# Patient Record
Sex: MALE | Race: WHITE
[De-identification: names, ages, dates, MRNs, and addresses within clinical notes are randomized per-mention and may not be internally consistent; named-entity substitution may affect disease eponyms.]

---

## 2019-09-23 ENCOUNTER — HOSPITAL ENCOUNTER (EMERGENCY)
Dept: HOSPITAL 72 - EMR | Age: 59
LOS: 1 days | Discharge: HOME | End: 2019-09-24
Payer: MEDICARE

## 2019-09-23 VITALS — WEIGHT: 160 LBS | BODY MASS INDEX: 25.71 KG/M2 | HEIGHT: 66 IN

## 2019-09-23 VITALS — SYSTOLIC BLOOD PRESSURE: 136 MMHG | DIASTOLIC BLOOD PRESSURE: 89 MMHG

## 2019-09-23 DIAGNOSIS — M54.5: Primary | ICD-10-CM

## 2019-09-23 DIAGNOSIS — G89.29: ICD-10-CM

## 2019-09-23 DIAGNOSIS — F10.10: ICD-10-CM

## 2019-09-23 PROCEDURE — 96372 THER/PROPH/DIAG INJ SC/IM: CPT

## 2019-09-23 PROCEDURE — 93005 ELECTROCARDIOGRAM TRACING: CPT

## 2019-09-23 PROCEDURE — 94640 AIRWAY INHALATION TREATMENT: CPT

## 2019-09-23 PROCEDURE — 99284 EMERGENCY DEPT VISIT MOD MDM: CPT

## 2019-09-23 NOTE — NUR
ED Nurse Note:

Pt brought in by mely 58 c/o RT leg pain since AM . Denies fall, no trauma. 
Pt has been drinking and appears drunk. VSS

## 2019-09-23 NOTE — EMERGENCY ROOM REPORT
History of Present Illness


General


Chief Complaint:  Lower Back Pain or Injury


Source:  Patient





Present Illness


HPI


Patient 59-year-old male who presents after increased generalized pain to the 

epigastric area as well as to the low back.  He reports having generalized 

pain.  He states that he had been drinking regularly for several years.  

Reports having persistent pain to his right lower extremity from playing soccer 

for many years.Patient reports drinking alcohol  daily he denies a fever.  He 

denies dysuria or abdominal pain.  He reports urinating normally.  He reports 

having some chronic pain to the right lower extremity.


Allergies:  


Coded Allergies:  


     No Known Allergies (Unverified , 9/23/19)





Patient History


Past Medical History:  see triage record


Reviewed Nursing Documentation:  PMH: Agreed; PSxH: Agreed





Nursing Documentation-PMH


Past Medical History Deferred:  Patient Unconscious





Review of Systems


All Other Systems:  negative except mentioned in HPI





Physical Exam





Vital Signs








  Date Time  Temp Pulse Resp B/P (MAP) Pulse Ox O2 Delivery O2 Flow Rate FiO2


 


9/23/19 20:44 97.9 100 18 136/89 (105) 98 Room Air  








Sp02 EP Interpretation:  reviewed, normal


General Appearance:  normal inspection, alert, non-toxic, Chronically Ill


Head:  atraumatic


ENT:  normal ENT inspection, hearing grossly normal, normal voice


Neck:  normal inspection, full range of motion, supple, no bony tend


Respiratory:  normal inspection, lungs clear, normal breath sounds, no 

respiratory distress, no retraction, no wheezing


Cardiovascular #1:  regular rate, rhythm, no edema


Gastrointestinal:  normal inspection, normal bowel sounds, non tender, soft, no 

guarding, no hernia


Genitourinary:  no CVA tenderness


Musculoskeletal:  normal inspection, back normal, normal range of motion


Neurologic:  normal inspection, alert, responsive, speech normal, other - 

Slurred speech


Psychiatric:  normal inspection, judgement/insight normal, mood/affect normal





Medical Decision Making


Diagnostic Impression:  


 Primary Impression:  


 Alcohol abuse


 Additional Impression:  


 Exacerbation of chronic back pain


ER Course


Patient presented for low back pain.  Differential included but was not limited 

to herniated disc, cauda equina syndrome, abdominal aortic aneurysm, perforated 

ulcer, spinal epidural abscess, spinal stenosis, lumbar fracture, metastatic 

lesion, pyelonephritis.   


Patient was noted to initially be intoxicated with alcohol and pain appears to 

be somewhat chronic.





Patient was observed in the emergency department.  He was noted to be more 

sober and was subsequent given nonnarcotic pain medications.





Patient was advised alcohol cessation.  Does not show any signs of incontinence 

or urinary retention. 


At the time of discharge patient is awake alert oriented and able to ambulate 

without assistance.


Patient was given prescription for symptomatic treatment.  


Patient was advised to recheck with primary care physician in 1-2 days. 


Patient to return for any worsening, pain, fever, incontinence or other 

concerns.





Last Vital Signs








  Date Time  Temp Pulse Resp B/P (MAP) Pulse Ox O2 Delivery O2 Flow Rate FiO2


 


9/23/19 20:44 97.9 100 18 136/89 (105) 98 Room Air  








Status:  improved


Disposition:  HOME, SELF-CARE


Condition:  Stable


Scripts


Gabapentin* (GABAPENTIN*) 400 Mg Capsule


400 MG ORAL TWICE A DAY, #20 CAP 0 Refills


   Prov: Mark Wolff MD         9/24/19











Mark Wolff MD Sep 23, 2019 21:10

## 2019-09-24 VITALS — SYSTOLIC BLOOD PRESSURE: 124 MMHG | DIASTOLIC BLOOD PRESSURE: 81 MMHG

## 2019-09-24 VITALS — DIASTOLIC BLOOD PRESSURE: 60 MMHG | SYSTOLIC BLOOD PRESSURE: 102 MMHG

## 2019-09-24 NOTE — NUR
ED Nurse Note:

Pt resting in bed with eyes closed, non-labored breathing, no signs of 
distress. Will continue to monitor

## 2019-09-25 NOTE — CARDIOLOGY REPORT
--------------- APPROVED REPORT --------------





EKG Measurement

Heart [id number]

OH [id number]

[id number]

[id number]

[id number]





Normal sinus rhythm

Possible Anterior infarct, age undetermined

Abnormal ECG